# Patient Record
Sex: MALE | URBAN - METROPOLITAN AREA
[De-identification: names, ages, dates, MRNs, and addresses within clinical notes are randomized per-mention and may not be internally consistent; named-entity substitution may affect disease eponyms.]

---

## 2019-02-12 ENCOUNTER — APPOINTMENT (RX ONLY)
Dept: URBAN - METROPOLITAN AREA CLINIC 4 | Facility: CLINIC | Age: 39
Setting detail: DERMATOLOGY
End: 2019-02-12

## 2019-02-12 DIAGNOSIS — L88 PYODERMA GANGRENOSUM: ICD-10-CM

## 2019-02-12 PROBLEM — L30.9 DERMATITIS, UNSPECIFIED: Status: ACTIVE | Noted: 2019-02-12

## 2019-02-12 PROBLEM — I10 ESSENTIAL (PRIMARY) HYPERTENSION: Status: ACTIVE | Noted: 2019-02-12

## 2019-02-12 PROCEDURE — ? BIOPSY BY SHAVE METHOD (SELF-PAY)

## 2019-02-12 PROCEDURE — ? COUNSELING

## 2019-02-12 ASSESSMENT — LOCATION DETAILED DESCRIPTION DERM: LOCATION DETAILED: RIGHT INFERIOR CENTRAL MALAR CHEEK

## 2019-02-12 ASSESSMENT — LOCATION SIMPLE DESCRIPTION DERM: LOCATION SIMPLE: RIGHT CHEEK

## 2019-02-12 ASSESSMENT — LOCATION ZONE DERM: LOCATION ZONE: FACE

## 2019-02-12 NOTE — PROCEDURE: BIOPSY BY SHAVE METHOD (SELF-PAY)
Anesthesia Volume In Cc: 0.5
Lab Facility: 
Anesthesia Type: 1% lidocaine with epinephrine
Wound Care: Petrolatum
Notification Instructions: Patient will be notified of biopsy results. However, patient instructed to call the office if not contacted within 2 weeks.
Hemostasis: Electrocautery
Post-Care Instructions: I reviewed with the patient in detail post-care instructions. Patient is to keep the biopsy site dry overnight, and then apply bacitracin twice daily until healed. Patient may apply hydrogen peroxide soaks to remove any crusting.
Biopsy Method: Dermablade
X Size Of Lesion In Cm (Optional): 0
Lab: [id number]
Biopsy Type: H and E
Billing Type: Third-Party Bill
Consent: Written consent was obtained and risks were reviewed including but not limited to scarring, infection, bleeding, scabbing, incomplete removal, nerve damage and allergy to anesthesia.
Detail Level: Detailed

## 2020-05-26 ENCOUNTER — HOSPITAL ENCOUNTER (OUTPATIENT)
Dept: LAB | Facility: MEDICAL CENTER | Age: 40
End: 2020-05-26
Attending: NURSE PRACTITIONER
Payer: COMMERCIAL

## 2020-05-26 ENCOUNTER — OFFICE VISIT (OUTPATIENT)
Dept: URGENT CARE | Facility: PHYSICIAN GROUP | Age: 40
End: 2020-05-26
Payer: COMMERCIAL

## 2020-05-26 VITALS
OXYGEN SATURATION: 97 % | HEIGHT: 68 IN | DIASTOLIC BLOOD PRESSURE: 90 MMHG | HEART RATE: 82 BPM | BODY MASS INDEX: 32.43 KG/M2 | TEMPERATURE: 98.6 F | WEIGHT: 214 LBS | SYSTOLIC BLOOD PRESSURE: 138 MMHG

## 2020-05-26 DIAGNOSIS — L02.31 ABSCESS, GLUTEAL, LEFT: ICD-10-CM

## 2020-05-26 LAB
ALBUMIN SERPL BCP-MCNC: 4.2 G/DL (ref 3.2–4.9)
ALBUMIN/GLOB SERPL: 1.5 G/DL
ALP SERPL-CCNC: 79 U/L (ref 30–99)
ALT SERPL-CCNC: 46 U/L (ref 2–50)
ANION GAP SERPL CALC-SCNC: 11 MMOL/L (ref 7–16)
APPEARANCE UR: CLEAR
AST SERPL-CCNC: 25 U/L (ref 12–45)
BASOPHILS # BLD AUTO: 0.4 % (ref 0–1.8)
BASOPHILS # BLD: 0.04 K/UL (ref 0–0.12)
BILIRUB SERPL-MCNC: 0.6 MG/DL (ref 0.1–1.5)
BILIRUB UR QL STRIP.AUTO: NEGATIVE
BUN SERPL-MCNC: 13 MG/DL (ref 8–22)
CALCIUM SERPL-MCNC: 9.8 MG/DL (ref 8.5–10.5)
CHLORIDE SERPL-SCNC: 100 MMOL/L (ref 96–112)
CHOLEST SERPL-MCNC: 163 MG/DL (ref 100–199)
CO2 SERPL-SCNC: 26 MMOL/L (ref 20–33)
COLOR UR: YELLOW
CREAT SERPL-MCNC: 0.84 MG/DL (ref 0.5–1.4)
EOSINOPHIL # BLD AUTO: 0.18 K/UL (ref 0–0.51)
EOSINOPHIL NFR BLD: 1.7 % (ref 0–6.9)
ERYTHROCYTE [DISTWIDTH] IN BLOOD BY AUTOMATED COUNT: 43 FL (ref 35.9–50)
FASTING STATUS PATIENT QL REPORTED: NORMAL
GLOBULIN SER CALC-MCNC: 2.8 G/DL (ref 1.9–3.5)
GLUCOSE SERPL-MCNC: 97 MG/DL (ref 65–99)
GLUCOSE UR STRIP.AUTO-MCNC: NEGATIVE MG/DL
HCT VFR BLD AUTO: 48.4 % (ref 42–52)
HDLC SERPL-MCNC: 35 MG/DL
HGB BLD-MCNC: 16 G/DL (ref 14–18)
IMM GRANULOCYTES # BLD AUTO: 0.04 K/UL (ref 0–0.11)
IMM GRANULOCYTES NFR BLD AUTO: 0.4 % (ref 0–0.9)
KETONES UR STRIP.AUTO-MCNC: NEGATIVE MG/DL
LDLC SERPL CALC-MCNC: 79 MG/DL
LEUKOCYTE ESTERASE UR QL STRIP.AUTO: NEGATIVE
LYMPHOCYTES # BLD AUTO: 2.18 K/UL (ref 1–4.8)
LYMPHOCYTES NFR BLD: 20.6 % (ref 22–41)
MCH RBC QN AUTO: 31.5 PG (ref 27–33)
MCHC RBC AUTO-ENTMCNC: 33.1 G/DL (ref 33.7–35.3)
MCV RBC AUTO: 95.3 FL (ref 81.4–97.8)
MICRO URNS: NORMAL
MONOCYTES # BLD AUTO: 0.72 K/UL (ref 0–0.85)
MONOCYTES NFR BLD AUTO: 6.8 % (ref 0–13.4)
NEUTROPHILS # BLD AUTO: 7.41 K/UL (ref 1.82–7.42)
NEUTROPHILS NFR BLD: 70.1 % (ref 44–72)
NITRITE UR QL STRIP.AUTO: NEGATIVE
NRBC # BLD AUTO: 0 K/UL
NRBC BLD-RTO: 0 /100 WBC
PH UR STRIP.AUTO: 6 [PH] (ref 5–8)
PLATELET # BLD AUTO: 183 K/UL (ref 164–446)
PMV BLD AUTO: 11.8 FL (ref 9–12.9)
POTASSIUM SERPL-SCNC: 4.3 MMOL/L (ref 3.6–5.5)
PROT SERPL-MCNC: 7 G/DL (ref 6–8.2)
PROT UR QL STRIP: NEGATIVE MG/DL
RBC # BLD AUTO: 5.08 M/UL (ref 4.7–6.1)
RBC UR QL AUTO: NEGATIVE
SODIUM SERPL-SCNC: 137 MMOL/L (ref 135–145)
SP GR UR STRIP.AUTO: 1.02
TRIGL SERPL-MCNC: 244 MG/DL (ref 0–149)
UROBILINOGEN UR STRIP.AUTO-MCNC: 0.2 MG/DL
WBC # BLD AUTO: 10.6 K/UL (ref 4.8–10.8)

## 2020-05-26 PROCEDURE — 85025 COMPLETE CBC W/AUTO DIFF WBC: CPT

## 2020-05-26 PROCEDURE — 80061 LIPID PANEL: CPT

## 2020-05-26 PROCEDURE — 81003 URINALYSIS AUTO W/O SCOPE: CPT

## 2020-05-26 PROCEDURE — 80053 COMPREHEN METABOLIC PANEL: CPT

## 2020-05-26 PROCEDURE — 99214 OFFICE O/P EST MOD 30 MIN: CPT | Performed by: NURSE PRACTITIONER

## 2020-05-26 PROCEDURE — 36415 COLL VENOUS BLD VENIPUNCTURE: CPT

## 2020-05-26 RX ORDER — SULFAMETHOXAZOLE AND TRIMETHOPRIM 800; 160 MG/1; MG/1
1 TABLET ORAL 2 TIMES DAILY
Qty: 10 TAB | Refills: 0 | Status: SHIPPED | OUTPATIENT
Start: 2020-05-26 | End: 2020-05-31

## 2020-05-26 RX ORDER — LOSARTAN POTASSIUM 50 MG/1
TABLET ORAL
COMMUNITY
Start: 2020-05-20

## 2020-05-26 RX ORDER — HYDROCODONE BITARTRATE AND ACETAMINOPHEN 5; 325 MG/1; MG/1
1 TABLET ORAL EVERY 6 HOURS PRN
Qty: 10 TAB | Refills: 0 | Status: SHIPPED | OUTPATIENT
Start: 2020-05-26 | End: 2020-05-31

## 2020-05-26 RX ORDER — METOPROLOL SUCCINATE 50 MG/1
TABLET, EXTENDED RELEASE ORAL
COMMUNITY
Start: 2020-05-20

## 2020-05-26 SDOH — HEALTH STABILITY: MENTAL HEALTH: HOW OFTEN DO YOU HAVE 6 OR MORE DRINKS ON ONE OCCASION?: MONTHLY

## 2020-05-26 ASSESSMENT — ENCOUNTER SYMPTOMS
CHILLS: 0
NAUSEA: 0
FEVER: 0

## 2020-05-26 ASSESSMENT — LIFESTYLE VARIABLES: SUBSTANCE_ABUSE: 0

## 2020-05-26 NOTE — LETTER
May 26, 2020       Patient: Anam Vance   YOB: 1980   Date of Visit: 5/26/2020         To Whom It May Concern:    In my medical opinion, I recommend that Anam Vance return to full duty, no restrictions on 05/29/2020              Sincerely,          MORIS RuizPHASEEB.  Electronically Signed

## 2020-05-26 NOTE — PROGRESS NOTES
"Subjective:      Anam Vance is a 39 y.o. male who presents with Cyst (tailbone since friday)    Reviewed past medical, surgical and family history. Reviewed prescription and OTC medications with patient in electronic health record today  Allergies: Patient has no known allergies.        No Known Allergies          Cyst   This is a new (3 days) problem. The problem occurs constantly. The problem has been gradually worsening. Pertinent negatives include no chills, fever or nausea. Associated symptoms comments: Left buttock  . Exacerbated by: movement, sitting  He has tried acetaminophen, ice, heat, NSAIDs, position changes and relaxation for the symptoms. The treatment provided mild relief.       Review of Systems   Constitutional: Negative for chills and fever.   Gastrointestinal: Negative for nausea.   Genitourinary: Negative for dysuria, frequency, hematuria and urgency.   Psychiatric/Behavioral: Negative for substance abuse.          Objective:     /90   Pulse 82   Temp 37 °C (98.6 °F) (Temporal)   Ht 1.727 m (5' 8\")   Wt 97.1 kg (214 lb)   SpO2 97%   BMI 32.54 kg/m²      Physical Exam  Vitals signs and nursing note reviewed.   Constitutional:       Appearance: Normal appearance. He is normal weight.   HENT:      Mouth/Throat:      Mouth: Mucous membranes are moist.   Neck:      Musculoskeletal: Normal range of motion.   Cardiovascular:      Rate and Rhythm: Normal rate.      Pulses: Normal pulses.   Skin:     General: Skin is warm.      Capillary Refill: Capillary refill takes less than 2 seconds.      Findings: Erythema present.          Neurological:      Mental Status: He is alert and oriented to person, place, and time.   Psychiatric:         Mood and Affect: Mood normal.         Behavior: Behavior normal.         Thought Content: Thought content normal.                 Assessment/Plan:       1. Abscess, gluteal, left  Not drainable today.     Educated in proper administration of " medication(s) ordered today including safety, possible SE, risks, benefits, rationale and alternatives to therapy.     Return to urgent care clinic or PCP  5-7  days if current symptoms are not resolving in a satisfactory manner or sooner if new or worsening symptoms occur.     Differential diagnosis, natural history, supportive care, and indications for immediate follow-up. Advised of signs and symptoms which would warrant further evaluation and /or emergent evaluation in ER.      Verbalized agreement with this treatment plan and seemed to understand without barriers. Questions were encouraged and answered to satisfaction.     OTC  analgesic of choice (tylenol or ibuprofen). Follow manufactures dosing and safety precautions.  Maximum 3000 mg PO / 24 hours. Advised that acetaminophen  325 mg is in each tablet of Norco and must be counted into daily maximums.     Advised not to drink ETOH, drive car or operate machinery while taking narcotic RX . Verbalizes understanding of safety instructions. Narc Check verified. [place]  Aware web site evaluation: I have obtained and reviewed patient utilization report from [facility] pharmacy database prior to writing prescription for controlled substance II, III or IV per [place] bill . Opioid Risk Tool screen completed.  I believe the benefits of controlled substance therapy outweigh the risks. The reasons for prescribing controlled substances include non-narcotic alternatives have been inadequate for symptom control. Accordingly, I have discussed the risk and benefits, treatment plan, and alternative therapies with the patient.

## 2020-05-26 NOTE — PATIENT INSTRUCTIONS
1. Abscess, gluteal, right         Warms compresses or Epsom salt soaks to affected area 2-3 times a day for 15 min.

## 2022-06-22 ENCOUNTER — OFFICE VISIT (OUTPATIENT)
Dept: OCCUPATIONAL MEDICINE | Facility: CLINIC | Age: 42
End: 2022-06-22

## 2022-06-22 DIAGNOSIS — Z02.4 ENCOUNTER FOR COMMERCIAL DRIVER MEDICAL EXAMINATION (CDME): ICD-10-CM

## 2022-06-22 PROCEDURE — 7100 PR DOT PHYSICAL: Performed by: NURSE PRACTITIONER

## 2023-09-08 ENCOUNTER — HOSPITAL ENCOUNTER (OUTPATIENT)
Dept: RADIOLOGY | Facility: MEDICAL CENTER | Age: 43
End: 2023-09-08
Attending: NURSE PRACTITIONER
Payer: COMMERCIAL

## 2023-09-08 ENCOUNTER — OFFICE VISIT (OUTPATIENT)
Dept: URGENT CARE | Facility: PHYSICIAN GROUP | Age: 43
End: 2023-09-08
Payer: COMMERCIAL

## 2023-09-08 VITALS
RESPIRATION RATE: 18 BRPM | TEMPERATURE: 97.6 F | HEART RATE: 77 BPM | DIASTOLIC BLOOD PRESSURE: 86 MMHG | WEIGHT: 215 LBS | SYSTOLIC BLOOD PRESSURE: 122 MMHG | OXYGEN SATURATION: 96 % | HEIGHT: 69 IN | BODY MASS INDEX: 31.84 KG/M2

## 2023-09-08 DIAGNOSIS — S61.316A LACERATION OF RIGHT LITTLE FINGER WITHOUT FOREIGN BODY WITH DAMAGE TO NAIL, INITIAL ENCOUNTER: ICD-10-CM

## 2023-09-08 DIAGNOSIS — S67.10XA CRUSHING INJURY OF FINGER, INITIAL ENCOUNTER: ICD-10-CM

## 2023-09-08 DIAGNOSIS — S60.10XA SUBUNGUAL HEMATOMA OF FINGERNAIL, INITIAL ENCOUNTER: ICD-10-CM

## 2023-09-08 PROCEDURE — 90471 IMMUNIZATION ADMIN: CPT | Performed by: NURSE PRACTITIONER

## 2023-09-08 PROCEDURE — 3079F DIAST BP 80-89 MM HG: CPT | Performed by: NURSE PRACTITIONER

## 2023-09-08 PROCEDURE — 3074F SYST BP LT 130 MM HG: CPT | Performed by: NURSE PRACTITIONER

## 2023-09-08 PROCEDURE — 99203 OFFICE O/P NEW LOW 30 MIN: CPT | Mod: 25 | Performed by: NURSE PRACTITIONER

## 2023-09-08 PROCEDURE — 73140 X-RAY EXAM OF FINGER(S): CPT | Mod: RT

## 2023-09-08 PROCEDURE — 90715 TDAP VACCINE 7 YRS/> IM: CPT | Performed by: NURSE PRACTITIONER

## 2023-09-08 PROCEDURE — 11740 EVACUATION SUBUNGUAL HMTMA: CPT | Performed by: NURSE PRACTITIONER

## 2023-09-08 PROCEDURE — 12041 INTMD RPR N-HF/GENIT 2.5CM/<: CPT | Mod: 59 | Performed by: NURSE PRACTITIONER

## 2023-09-08 RX ORDER — IBUPROFEN 600 MG/1
600 TABLET ORAL EVERY 6 HOURS PRN
Qty: 30 TABLET | Refills: 0 | Status: SHIPPED | OUTPATIENT
Start: 2023-09-08

## 2023-09-08 RX ORDER — OXYCODONE HYDROCHLORIDE AND ACETAMINOPHEN 5; 325 MG/1; MG/1
1-2 TABLET ORAL EVERY 6 HOURS PRN
Qty: 12 TABLET | Refills: 0 | Status: SHIPPED | OUTPATIENT
Start: 2023-09-08 | End: 2023-09-11

## 2023-09-08 RX ORDER — IBUPROFEN 200 MG
600 TABLET ORAL ONCE
Status: COMPLETED | OUTPATIENT
Start: 2023-09-08 | End: 2023-09-08

## 2023-09-08 RX ORDER — CEPHALEXIN 500 MG/1
500 CAPSULE ORAL 4 TIMES DAILY
Qty: 20 CAPSULE | Refills: 0 | Status: SHIPPED | OUTPATIENT
Start: 2023-09-08 | End: 2023-09-13

## 2023-09-08 RX ADMIN — Medication 600 MG: at 15:53

## 2023-09-08 NOTE — PROGRESS NOTES
"  Subjective:     Anam Vance is a 42 y.o. male who presents for Nail Problem (Right pinky nail is sticking out of nailbed, smashed pinky with hammer  )      States he hit his finger with a hammer. Denies workman's comp. Has cleaned the wound with water. Tip has numbness.          Nail Problem  This is a new problem. The current episode started today. The symptoms are aggravated by bending.       History reviewed. No pertinent past medical history.    History reviewed. No pertinent surgical history.    Social History     Socioeconomic History    Marital status:      Spouse name: Not on file    Number of children: Not on file    Years of education: Not on file    Highest education level: Not on file   Occupational History    Not on file   Tobacco Use    Smoking status: Former     Types: Cigarettes    Smokeless tobacco: Never    Tobacco comments:     quit 8yrs ago   Substance and Sexual Activity    Alcohol use: Yes     Comment: social    Drug use: Not on file    Sexual activity: Not on file   Other Topics Concern    Not on file   Social History Narrative    Not on file     Social Determinants of Health     Financial Resource Strain: Not on file   Food Insecurity: Not on file   Transportation Needs: Not on file   Physical Activity: Not on file   Stress: Not on file   Social Connections: Not on file   Intimate Partner Violence: Not on file   Housing Stability: Not on file        History reviewed. No pertinent family history.     No Known Allergies    Review of Systems   Musculoskeletal:  Positive for joint pain.   Neurological:  Positive for sensory change.   All other systems reviewed and are negative.       Objective:   /86   Pulse 77   Temp 36.4 °C (97.6 °F) (Temporal)   Resp 18   Ht 1.753 m (5' 9\")   Wt 97.5 kg (215 lb)   SpO2 96%   BMI 31.75 kg/m²     Physical Exam  Vitals reviewed.   Pulmonary:      Effort: Pulmonary effort is normal.   Musculoskeletal:         General: Swelling, " tenderness and signs of injury present. No deformity.      Right hand: Swelling, laceration, tenderness and bony tenderness present. Normal range of motion. Normal capillary refill.      Comments: Lateral right 5th digit, proximal nail lifted out of matrix. No laceration to proximal nail fold. Laceration laterally, 0.5 cm. 1/2 of the nail is still firmly attached. Subungual hematoma present.    Skin:     General: Skin is warm and dry.      Capillary Refill: Capillary refill takes less than 2 seconds.      Findings: Bruising present.   Neurological:      Mental Status: He is alert and oriented to person, place, and time.         Assessment/Plan:   1. Crushing injury of finger, initial encounter  - DX-FINGER(S) 2+ RIGHT; Future  - ibuprofen (MOTRIN) 600 MG Tab; Take 1 Tablet by mouth every 6 hours as needed for Inflammation or Moderate Pain.  Dispense: 30 Tablet; Refill: 0  - ibuprofen (Motrin) tablet 600 mg  - oxyCODONE-acetaminophen (PERCOCET) 5-325 MG Tab; Take 1-2 Tablets by mouth every 6 hours as needed for Severe Pain for up to 3 days.  Dispense: 12 Tablet; Refill: 0  - Consent for Opiate Prescription  - Tdap =>6yo IM  - cephALEXin (KEFLEX) 500 MG Cap; Take 1 Capsule by mouth 4 times a day for 5 days.  Dispense: 20 Capsule; Refill: 0    2. Laceration of right little finger without foreign body with damage to nail, initial encounter  - ibuprofen (MOTRIN) 600 MG Tab; Take 1 Tablet by mouth every 6 hours as needed for Inflammation or Moderate Pain.  Dispense: 30 Tablet; Refill: 0  - ibuprofen (Motrin) tablet 600 mg  - oxyCODONE-acetaminophen (PERCOCET) 5-325 MG Tab; Take 1-2 Tablets by mouth every 6 hours as needed for Severe Pain for up to 3 days.  Dispense: 12 Tablet; Refill: 0  - Consent for Opiate Prescription  - Tdap =>6yo IM  - cephALEXin (KEFLEX) 500 MG Cap; Take 1 Capsule by mouth 4 times a day for 5 days.  Dispense: 20 Capsule; Refill: 0    3. Subungual hematoma of fingernail, initial  encounter  DX-FINGER(S) 2+ RIGHT    Result Date: 9/8/2023 9/8/2023 3:55 PM HISTORY/REASON FOR EXAM:  Pain/Deformity Following Trauma; 5th digit. Fifth finger injury. TECHNIQUE/EXAM DESCRIPTION AND NUMBER OF VIEWS:  3 views of the RIGHT fingers. COMPARISON: None FINDINGS: There is no fracture or dislocation. The visualized osseous structures are in anatomic alignment. The joint spaces are grossly preserved. Bone mineralization is age-appropriate..     No acute osseous abnormality.    -Finger splint for protection.   -Wound care: Daily dressing change, keep covered while draining. Keep initial dressing in place for 24 hours, after which time most wounds can be opened to air if drainage from nail  has stopped.   -NSAID's (ibuprofen) and tylenol as directed for pain and inflammation.   -RICE Therapy: Rest, Ice, Compression, Elevation  -Gently clean area with mild soap and water. Can shower. Do not soak wound in water (dishwater, swimming pool, lake or other bodies of water).   -An over the counter antibiotic can be used twice daily, over sutured area.  -Follow up in #7-10 days for suture removal.     Follow up sooner for signs of infection (redness, red streaking, pus, foul odor, severe pain, increased swelling or fever) or any other concerns. Follow up emergently for severe uncontrolled pain, neurovascular compromise (decreased sensation, motion, or circulation).    Procedure: Laceration Repair  -Risks including bleeding, nerve damage, infection, and poor cosmetic outcome discussed. Benefits and alternatives discussed.   -Clean technique with sterile instruments and suture used  -Digit block with 2% lidocaine  -Closed with # 3 -5.0 Nylon interrupted sutures with good wound approximation  -Polysporin and dressing placed  -Patient tolerated well    Procedure: Trephination of Subungual Hematoma  -Area cleaned with betadine.   -Clean technique using sterile instruments  -18ga Needle (or cautery pen) used to bore a hole in  the nail with release of blood and improvement in pain.  -Dressing applied.  -Patient tolerated procedure well.    Discussed risks, benefits, and alternatives to procedure. Finger X-ray ordered to r/o tuft fracture.   Risks include:  -Oozing of blood from the site of trephination, typically 24-36 hours.  -Pain caused by pressure against or contact with the nail bed during the procedure.  -Discoloration of the nail lasting up to four weeks, and/or potential loss of the nail related to the primary injury.  -Potential for clotting, leading to reaccumulation of blood.  -Secondary infection.    -No fracture noted. Tetanus vaccination status reviewed: Td vaccination indicated and given today. Contingent antibiotic for signs of infection.     Differential diagnosis, natural history, supportive care, and indications for immediate follow-up discussed.

## 2023-09-13 ASSESSMENT — ENCOUNTER SYMPTOMS: SENSORY CHANGE: 1

## 2023-09-15 ENCOUNTER — OFFICE VISIT (OUTPATIENT)
Dept: URGENT CARE | Facility: PHYSICIAN GROUP | Age: 43
End: 2023-09-15

## 2023-09-15 VITALS
OXYGEN SATURATION: 95 % | SYSTOLIC BLOOD PRESSURE: 118 MMHG | HEART RATE: 89 BPM | TEMPERATURE: 97.7 F | WEIGHT: 215 LBS | HEIGHT: 69 IN | BODY MASS INDEX: 31.84 KG/M2 | DIASTOLIC BLOOD PRESSURE: 76 MMHG | RESPIRATION RATE: 18 BRPM

## 2023-09-15 DIAGNOSIS — Z48.02 ENCOUNTER FOR REMOVAL OF SUTURES: ICD-10-CM

## 2023-09-15 DIAGNOSIS — S67.10XA CRUSHING INJURY OF FINGER, INITIAL ENCOUNTER: ICD-10-CM

## 2023-09-15 PROCEDURE — 99999 PR NO CHARGE: CPT | Performed by: NURSE PRACTITIONER

## 2023-09-15 PROCEDURE — 3078F DIAST BP <80 MM HG: CPT | Performed by: NURSE PRACTITIONER

## 2023-09-15 PROCEDURE — 3074F SYST BP LT 130 MM HG: CPT | Performed by: NURSE PRACTITIONER

## 2023-09-15 ASSESSMENT — ENCOUNTER SYMPTOMS
CHILLS: 0
SENSORY CHANGE: 0
MYALGIAS: 0
TINGLING: 0
FEVER: 0

## 2023-09-15 NOTE — PROGRESS NOTES
"Subjective     Anam Vance is a 42 y.o. male who presents with Wound Check (R pinky, x 1 week )            Wound Check    Anam is here for suture removal.  Smash injury to right pinky finger.  Has finished antibiotics.  Nailbed still intact.  No swelling, erythema, bruising or drainage.  States numbness has improved.  States will bandage finger at work and when sleeping.    Review of Systems   Constitutional:  Negative for chills, fever and malaise/fatigue.   Musculoskeletal:  Negative for joint pain and myalgias.   Skin:         Encounter for suture removal   Neurological:  Negative for tingling and sensory change.   All other systems reviewed and are negative.             Objective     /76   Pulse 89   Temp 36.5 °C (97.7 °F)   Resp 18   Ht 1.753 m (5' 9\")   Wt 97.5 kg (215 lb)   SpO2 95%   BMI 31.75 kg/m²      Physical Exam  Vitals reviewed.   Constitutional:       General: He is awake. He is not in acute distress.     Appearance: Normal appearance. He is not ill-appearing, toxic-appearing or diaphoretic.   Cardiovascular:      Rate and Rhythm: Normal rate.   Pulmonary:      Effort: Pulmonary effort is normal.   Skin:     General: Skin is warm and dry.      Comments: Three intact interrupted sutures in right pinky finger.  No erythema, bruising, drainage from site.  Nailbed intact.  Mild discomfort with suture removal, patient tolerated well.   Neurological:      Mental Status: He is alert and oriented to person, place, and time.   Psychiatric:         Attention and Perception: Attention normal.         Mood and Affect: Mood normal.         Speech: Speech normal.         Behavior: Behavior normal. Behavior is cooperative.                             Assessment & Plan        1. Encounter for removal of sutures    2. Crushing injury of finger, initial encounter    May continue to bandages as necessary  No need to return unless complication/pain/drainage                  "

## 2023-09-18 ENCOUNTER — OFFICE VISIT (OUTPATIENT)
Dept: URGENT CARE | Facility: PHYSICIAN GROUP | Age: 43
End: 2023-09-18
Payer: COMMERCIAL

## 2023-09-18 VITALS
HEIGHT: 69 IN | WEIGHT: 215 LBS | DIASTOLIC BLOOD PRESSURE: 88 MMHG | OXYGEN SATURATION: 96 % | TEMPERATURE: 98.7 F | BODY MASS INDEX: 31.84 KG/M2 | HEART RATE: 80 BPM | RESPIRATION RATE: 16 BRPM | SYSTOLIC BLOOD PRESSURE: 142 MMHG

## 2023-09-18 DIAGNOSIS — S61.309A PARTIAL AVULSION OF FINGERNAIL, INITIAL ENCOUNTER: ICD-10-CM

## 2023-09-18 PROCEDURE — 3079F DIAST BP 80-89 MM HG: CPT | Performed by: NURSE PRACTITIONER

## 2023-09-18 PROCEDURE — 99213 OFFICE O/P EST LOW 20 MIN: CPT | Performed by: NURSE PRACTITIONER

## 2023-09-18 PROCEDURE — 3077F SYST BP >= 140 MM HG: CPT | Performed by: NURSE PRACTITIONER

## 2023-09-19 ASSESSMENT — ENCOUNTER SYMPTOMS
GASTROINTESTINAL NEGATIVE: 1
RESPIRATORY NEGATIVE: 1
EYES NEGATIVE: 1
CARDIOVASCULAR NEGATIVE: 1
CHILLS: 0
CONSTITUTIONAL NEGATIVE: 1
FEVER: 0

## 2023-09-20 NOTE — PROGRESS NOTES
"Subjective:   Anam Vance is a 42 y.o. male who presents for Nail Problem (Right 5th digit.)      Patient presents for evaluation of his right pinky fingernail that has begun to lift off of the lateral side today.  He previously injured his right pinky nail due to crush injury at home and did have stitches placed on 9/8/2023.  He reports that he knew at that time that the fingernail will eventually fall off.  He reports the provider had tucked it back in after the initial injury.  Today, while at work he snagged it and it did lift quite a bit.  He states it does not hurt unless you lift it up a bit.  He denies any drainage from the area.  He denies any bleeding after he snagged the nail.         Review of Systems   Constitutional: Negative.  Negative for chills and fever.   HENT: Negative.     Eyes: Negative.    Respiratory: Negative.     Cardiovascular: Negative.    Gastrointestinal: Negative.    Skin: Negative.         Right fifth finger nail partial avulsion       Medications, Allergies, and current problem list reviewed today in Epic.     Objective:     BP (!) 142/88 (BP Location: Left arm, Patient Position: Sitting, BP Cuff Size: Adult long)   Pulse 80   Temp 37.1 °C (98.7 °F) (Temporal)   Resp 16   Ht 1.753 m (5' 9\")   Wt 97.5 kg (215 lb)   SpO2 96%     Physical Exam  Vitals reviewed.   Constitutional:       Appearance: Normal appearance.   HENT:      Head: Normocephalic and atraumatic.      Nose: Nose normal.      Mouth/Throat:      Mouth: Mucous membranes are moist.      Pharynx: Oropharynx is clear.   Eyes:      Extraocular Movements: Extraocular movements intact.      Conjunctiva/sclera: Conjunctivae normal.      Pupils: Pupils are equal, round, and reactive to light.   Cardiovascular:      Rate and Rhythm: Normal rate and regular rhythm.      Pulses: Normal pulses.      Heart sounds: Normal heart sounds.   Pulmonary:      Effort: Pulmonary effort is normal.      Breath sounds: Normal breath " sounds.   Abdominal:      General: Abdomen is flat. Bowel sounds are normal.      Palpations: Abdomen is soft.   Musculoskeletal:         General: Normal range of motion.      Cervical back: Normal range of motion and neck supple.   Skin:     General: Skin is warm and dry.      Capillary Refill: Capillary refill takes less than 2 seconds.      Comments: There is a partial avulsion of the right fifth pinky fingernail.  The nailbed appear clean and dry.  There is mild tenderness with manipulation of the nail.  No drainage noted. No erythema or edema.  Nail was trimmed slightly on the side, and a Band-Aid was placed.    Neurological:      General: No focal deficit present.      Mental Status: He is alert and oriented to person, place, and time.   Psychiatric:         Mood and Affect: Mood normal.         Behavior: Behavior normal.         Assessment/Plan:     Diagnosis and associated orders:     1. Partial avulsion of fingernail, initial encounter           Comments/MDM:     Patient was educated to keep the nail covered with a Band-Aid and gloves while at work to help prevent further injury with the nail being snagged as this grows out.  Patient will use mild soap and water to cleans the area, and he will keep it clean and dry.  He will follow up with PCP next week for further evaluation, or return to  for any worsening symptoms.          Differential diagnosis, natural history, supportive care, and indications for immediate follow-up discussed.    Advised the patient to follow-up with the primary care physician for recheck, reevaluation, and consideration of further management.    Please note that this dictation was created using voice recognition software. I have made a reasonable attempt to correct obvious errors, but I expect that there are errors of grammar and possibly content that I did not discover before finalizing the note.    This note was electronically signed by LENO Caruso

## 2024-02-18 ENCOUNTER — OFFICE VISIT (OUTPATIENT)
Dept: URGENT CARE | Facility: PHYSICIAN GROUP | Age: 44
End: 2024-02-18
Payer: COMMERCIAL

## 2024-02-18 VITALS
HEART RATE: 88 BPM | TEMPERATURE: 97.6 F | HEIGHT: 69 IN | OXYGEN SATURATION: 97 % | DIASTOLIC BLOOD PRESSURE: 84 MMHG | SYSTOLIC BLOOD PRESSURE: 138 MMHG | BODY MASS INDEX: 32.82 KG/M2 | WEIGHT: 221.56 LBS | RESPIRATION RATE: 18 BRPM

## 2024-02-18 DIAGNOSIS — J01.00 ACUTE NON-RECURRENT MAXILLARY SINUSITIS: ICD-10-CM

## 2024-02-18 PROCEDURE — 3079F DIAST BP 80-89 MM HG: CPT | Performed by: NURSE PRACTITIONER

## 2024-02-18 PROCEDURE — 99213 OFFICE O/P EST LOW 20 MIN: CPT | Performed by: NURSE PRACTITIONER

## 2024-02-18 PROCEDURE — 3075F SYST BP GE 130 - 139MM HG: CPT | Performed by: NURSE PRACTITIONER

## 2024-02-18 RX ORDER — AMOXICILLIN AND CLAVULANATE POTASSIUM 875; 125 MG/1; MG/1
1 TABLET, FILM COATED ORAL 2 TIMES DAILY
Qty: 20 TABLET | Refills: 0 | Status: SHIPPED | OUTPATIENT
Start: 2024-02-18 | End: 2024-02-28

## 2024-02-18 RX ORDER — IBUPROFEN 200 MG
200 TABLET ORAL EVERY 6 HOURS PRN
COMMUNITY

## 2024-02-18 ASSESSMENT — ENCOUNTER SYMPTOMS
HEADACHES: 1
DIARRHEA: 0
COUGH: 0
FEVER: 0
CHILLS: 0
MYALGIAS: 0
ORTHOPNEA: 0
NAUSEA: 0
SORE THROAT: 0
EYE DISCHARGE: 0
SINUS PAIN: 1

## 2024-02-18 NOTE — PROGRESS NOTES
Subjective     Anam Vance is a 43 y.o. male who presents with Sinus Problem (Nasal congestion, tightness/soreness around face, X 1 week. Pt has concerns over viral meningitis. Taken dayquil, sudafed, provided no-relief. States had 4 teeth taken out 2-2-24 and believes this sinus issue is caused from it.)            HPI  New problem.  Patient is a 43-year-old male who presents with nasal congestion, and sinus pain and pressure as well as left-sided sinus swelling for approximately 1 week.  He denies fever, chills, body aches, nausea, or diarrhea.  He denies any cough.  He states he has been taking over-the-counter DayQuil and NyQuil for the symptoms with minimal relief.  He does state he had 4 teeth taken out on February 2 and is concerned that this may be part of it although he did take antibiotics for 3 days following that procedure.    Patient has no known allergies.  Current Outpatient Medications on File Prior to Visit   Medication Sig Dispense Refill    ibuprofen (ADVIL) 200 MG Tab Take 200 mg by mouth every 6 hours as needed.      losartan (COZAAR) 50 MG Tab       metoprolol SR (TOPROL XL) 50 MG TABLET SR 24 HR       ibuprofen (MOTRIN) 600 MG Tab Take 1 Tablet by mouth every 6 hours as needed for Inflammation or Moderate Pain. (Patient not taking: Reported on 2/18/2024) 30 Tablet 0     No current facility-administered medications on file prior to visit.     Social History     Socioeconomic History    Marital status:      Spouse name: Not on file    Number of children: Not on file    Years of education: Not on file    Highest education level: Not on file   Occupational History    Not on file   Tobacco Use    Smoking status: Former     Types: Cigarettes    Smokeless tobacco: Never    Tobacco comments:     quit 8yrs ago   Vaping Use    Vaping Use: Never used   Substance and Sexual Activity    Alcohol use: Not Currently     Comment: social    Drug use: Not Currently    Sexual activity: Not on file  "  Other Topics Concern    Not on file   Social History Narrative    Not on file     Social Determinants of Health     Financial Resource Strain: Not on file   Food Insecurity: Not on file   Transportation Needs: Not on file   Physical Activity: Not on file   Stress: Not on file   Social Connections: Not on file   Intimate Partner Violence: Not on file   Housing Stability: Not on file     Breast Cancer-related family history is not on file.      Review of Systems   Constitutional:  Positive for malaise/fatigue. Negative for chills and fever.   HENT:  Positive for congestion and sinus pain. Negative for sore throat.    Eyes:  Negative for discharge.   Respiratory:  Negative for cough.    Cardiovascular:  Negative for chest pain and orthopnea.   Gastrointestinal:  Negative for diarrhea and nausea.   Musculoskeletal:  Negative for myalgias.   Neurological:  Positive for headaches.   Endo/Heme/Allergies:  Negative for environmental allergies.   All other systems reviewed and are negative.             Objective     /84 (BP Location: Left arm, Patient Position: Sitting, BP Cuff Size: Adult)   Pulse 88   Temp 36.4 °C (97.6 °F) (Temporal)   Resp 18   Ht 1.753 m (5' 9\")   Wt 101 kg (221 lb 9 oz)   SpO2 97%   BMI 32.72 kg/m²      Physical Exam  Constitutional:       General: He is not in acute distress.     Appearance: Normal appearance. He is well-developed.   HENT:      Head: Normocephalic.      Right Ear: Tympanic membrane and external ear normal.      Left Ear: Tympanic membrane and external ear normal.      Nose: Mucosal edema, congestion and rhinorrhea present.      Comments: Left maxillary sinus swelling.     Mouth/Throat:      Pharynx: No posterior oropharyngeal erythema.   Eyes:      General:         Right eye: No discharge.         Left eye: No discharge.      Conjunctiva/sclera: Conjunctivae normal.   Cardiovascular:      Rate and Rhythm: Normal rate and regular rhythm.      Heart sounds: Normal heart " sounds.   Pulmonary:      Effort: Pulmonary effort is normal.      Breath sounds: Normal breath sounds.   Musculoskeletal:         General: Normal range of motion.      Cervical back: Normal range of motion and neck supple.   Lymphadenopathy:      Cervical: No cervical adenopathy.   Skin:     General: Skin is warm and dry.   Neurological:      Mental Status: He is alert and oriented to person, place, and time.   Psychiatric:         Behavior: Behavior normal.         Thought Content: Thought content normal.                             Assessment & Plan        1. Acute non-recurrent maxillary sinusitis  amoxicillin-clavulanate (AUGMENTIN) 875-125 MG Tab        Patient may continue over-the-counter medications as directed for his symptoms.  I have given him a 7-day course of Augmentin with instructions to take 1 twice daily.  He is to follow-up if symptoms or not improving in the next 48 hours or for sure if they worsen in any way.

## 2024-12-23 ENCOUNTER — HOSPITAL ENCOUNTER (OUTPATIENT)
Dept: LAB | Facility: MEDICAL CENTER | Age: 44
End: 2024-12-23
Attending: PHYSICIAN ASSISTANT
Payer: COMMERCIAL

## 2024-12-23 LAB
ALBUMIN SERPL BCP-MCNC: 4.4 G/DL (ref 3.2–4.9)
ALBUMIN/GLOB SERPL: 1.7 G/DL
ALP SERPL-CCNC: 72 U/L (ref 30–99)
ALT SERPL-CCNC: 50 U/L (ref 2–50)
ANION GAP SERPL CALC-SCNC: 9 MMOL/L (ref 7–16)
AST SERPL-CCNC: 31 U/L (ref 12–45)
BILIRUB SERPL-MCNC: 0.5 MG/DL (ref 0.1–1.5)
BUN SERPL-MCNC: 14 MG/DL (ref 8–22)
CALCIUM ALBUM COR SERPL-MCNC: 9.4 MG/DL (ref 8.5–10.5)
CALCIUM SERPL-MCNC: 9.7 MG/DL (ref 8.5–10.5)
CHLORIDE SERPL-SCNC: 106 MMOL/L (ref 96–112)
CHOLEST SERPL-MCNC: 156 MG/DL (ref 100–199)
CO2 SERPL-SCNC: 24 MMOL/L (ref 20–33)
CREAT SERPL-MCNC: 0.89 MG/DL (ref 0.5–1.4)
FASTING STATUS PATIENT QL REPORTED: NORMAL
GFR SERPLBLD CREATININE-BSD FMLA CKD-EPI: 108 ML/MIN/1.73 M 2
GLOBULIN SER CALC-MCNC: 2.6 G/DL (ref 1.9–3.5)
GLUCOSE SERPL-MCNC: 102 MG/DL (ref 65–99)
HDLC SERPL-MCNC: 35 MG/DL
LDLC SERPL CALC-MCNC: 85 MG/DL
POTASSIUM SERPL-SCNC: 4.1 MMOL/L (ref 3.6–5.5)
PROT SERPL-MCNC: 7 G/DL (ref 6–8.2)
SODIUM SERPL-SCNC: 139 MMOL/L (ref 135–145)
TRIGL SERPL-MCNC: 182 MG/DL (ref 0–149)

## 2024-12-23 PROCEDURE — 82570 ASSAY OF URINE CREATININE: CPT

## 2024-12-23 PROCEDURE — 80053 COMPREHEN METABOLIC PANEL: CPT

## 2024-12-23 PROCEDURE — 36415 COLL VENOUS BLD VENIPUNCTURE: CPT

## 2024-12-23 PROCEDURE — 82043 UR ALBUMIN QUANTITATIVE: CPT

## 2024-12-23 PROCEDURE — 80061 LIPID PANEL: CPT

## 2024-12-26 LAB
COLLECT DURATION TIME SPEC: NORMAL HR
CREAT 24H UR-MCNC: 151 MG/DL
MICROALBUMIN 24H UR-MCNC: 2.2 MG/DL
MICROALBUMIN/CREAT 24H UR: 15 MG/G (ref 0–30)
SPECIMEN VOL ?TM UR: NORMAL ML

## 2025-03-29 ENCOUNTER — OFFICE VISIT (OUTPATIENT)
Dept: URGENT CARE | Facility: PHYSICIAN GROUP | Age: 45
End: 2025-03-29
Payer: COMMERCIAL

## 2025-03-29 VITALS
RESPIRATION RATE: 14 BRPM | OXYGEN SATURATION: 94 % | DIASTOLIC BLOOD PRESSURE: 84 MMHG | HEART RATE: 79 BPM | HEIGHT: 68 IN | TEMPERATURE: 97.3 F | SYSTOLIC BLOOD PRESSURE: 128 MMHG | WEIGHT: 222.66 LBS | BODY MASS INDEX: 33.75 KG/M2

## 2025-03-29 DIAGNOSIS — R23.9 IMPAIRED INTEGRITY OF SKIN AT SURGICAL SITE: ICD-10-CM

## 2025-03-29 RX ORDER — OMEPRAZOLE 20 MG/1
20 CAPSULE, DELAYED RELEASE ORAL DAILY
COMMUNITY

## 2025-03-29 NOTE — PROGRESS NOTES
Subjective:   CHIEF COMPLAINT  Chief Complaint   Patient presents with    Post-op Problem     Had his initial surgical follow up prior to today, now has an open surgical wound that has now begun to blood, blood is dark red and is being managed p/t is concerned about any risks        HPI     History of Present Illness  Anam Vance is a 44 y.o. male who presents for evaluation of a surgical site issue.    He underwent an open umbilical hernia surgery 2 weeks ago and has been adhering to the postoperative instruction of wearing a belt. This morning, while showering, he inadvertently scraped part of the incision, which had developed a scab. He reports no significant bleeding but notes the presence of dark red blood, which he believes is residual from the surgery. He is not experiencing any pain. He has not yet consulted his surgeon regarding this issue as it occurred this morning and the clinic was closed. His last consultation with his doctor was on 25th.  He is not on blood thinners        REVIEW OF SYSTEMS  General: no fever or chills  GI: no nausea or vomiting  See HPI for further details.    PAST MEDICAL HISTORY  There are no active problems to display for this patient.      SURGICAL HISTORY  patient denies any surgical history    ALLERGIES  No Known Allergies    CURRENT MEDICATIONS  Home Medications       Reviewed by Diomedes Roberts D.O. (Physician) on 03/29/25 at 1511  Med List Status: <None>     Medication Last Dose Status   ibuprofen (ADVIL) 200 MG Tab  Active   ibuprofen (MOTRIN) 600 MG Tab  Active   losartan (COZAAR) 50 MG Tab Taking Active   metoprolol SR (TOPROL XL) 50 MG TABLET SR 24 HR Taking Active   omeprazole (PRILOSEC) 20 MG delayed-release capsule Taking Active                    SOCIAL HISTORY  Social History     Tobacco Use    Smoking status: Former     Types: Cigarettes    Smokeless tobacco: Never    Tobacco comments:     quit 8yrs ago   Vaping Use    Vaping status: Never Used  "  Substance and Sexual Activity    Alcohol use: Not Currently     Comment: social    Drug use: Not Currently    Sexual activity: Not on file       FAMILY HISTORY  History reviewed. No pertinent family history.       Objective:   PHYSICAL EXAM  VITAL SIGNS: /84 (BP Location: Left arm, Patient Position: Sitting, BP Cuff Size: Adult)   Pulse 79   Temp 36.3 °C (97.3 °F) (Temporal)   Resp 14   Ht 1.727 m (5' 8\")   Wt 101 kg (222 lb 10.6 oz)   SpO2 94%   BMI 33.86 kg/m²     Gen: no acute distress, normal voice  Skin: dry, intact, moist mucosal membranes  Eyes: No conjunctival injection bilaterally.  Neck: Normal range of motion. No meningeal signs.   Lungs: No increased work of breathing.  CTAB w/ symmetric expansion  CV: RRR w/o murmurs or clicks  Abdomen: Bowel sounds normal, soft, no distention.  Horizontal incision/trochanter sites along the anterior abdomen.  Left-sided surgical site with mild scant bleeding with pressure and palpation.  No significant tenderness to palpation.  No serosanguineous or purulent discharge.  No extending erythema or edema.  No signs of a drainable abscess.  No induration.    Psych: normal affect, normal judgement, alert, awake    Assessment/Plan:     1. Impaired integrity of skin at surgical site        Epidermal layer with minimal dehiscence without evidence of secondary infection.  Wound was covered with gauze and Tegaderm.  Supplies were provided.  Discussed red flags and return precautions with the patient.  I did encourage him to discuss with his surgeon and schedule follow-up appointment.  Patient understood everything discussed and all questions were answered.  Return to urgent care as needed any new or worsening symptoms, or further questions/concerns.            Please note that this dictation was created using voice recognition software. I have made a reasonable attempt to correct obvious errors, but I expect that there are errors of grammar and possibly content " that I did not discover before finalizing the note.